# Patient Record
Sex: FEMALE | URBAN - NONMETROPOLITAN AREA
[De-identification: names, ages, dates, MRNs, and addresses within clinical notes are randomized per-mention and may not be internally consistent; named-entity substitution may affect disease eponyms.]

---

## 2022-11-22 ENCOUNTER — APPOINTMENT (OUTPATIENT)
Dept: URBAN - NONMETROPOLITAN AREA CLINIC 47 | Age: 52
Setting detail: DERMATOLOGY
End: 2022-11-28

## 2022-11-22 DIAGNOSIS — L30.9 DERMATITIS, UNSPECIFIED: ICD-10-CM

## 2022-11-22 PROCEDURE — 11105 PUNCH BX SKIN EA SEP/ADDL: CPT

## 2022-11-22 PROCEDURE — OTHER PRESCRIPTION: OTHER

## 2022-11-22 PROCEDURE — OTHER BIOPSY BY PUNCH METHOD: OTHER

## 2022-11-22 PROCEDURE — OTHER MIPS QUALITY: OTHER

## 2022-11-22 PROCEDURE — OTHER COUNSELING: OTHER

## 2022-11-22 PROCEDURE — 11104 PUNCH BX SKIN SINGLE LESION: CPT

## 2022-11-22 RX ORDER — TRIAMCINOLONE ACETONIDE 1 MG/G
CREAM TOPICAL
Qty: 454 | Refills: 3 | Status: ERX | COMMUNITY
Start: 2022-11-22

## 2022-11-22 ASSESSMENT — LOCATION ZONE DERM
LOCATION ZONE: TRUNK
LOCATION ZONE: LEG
LOCATION ZONE: ARM

## 2022-11-22 ASSESSMENT — LOCATION DETAILED DESCRIPTION DERM
LOCATION DETAILED: RIGHT ANTERIOR PROXIMAL THIGH
LOCATION DETAILED: LEFT PROXIMAL DORSAL FOREARM
LOCATION DETAILED: RIGHT LATERAL ABDOMEN

## 2022-11-22 ASSESSMENT — LOCATION SIMPLE DESCRIPTION DERM
LOCATION SIMPLE: ABDOMEN
LOCATION SIMPLE: RIGHT THIGH
LOCATION SIMPLE: LEFT FOREARM

## 2022-11-22 NOTE — PROCEDURE: COUNSELING
Detail Level: Detailed
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  Advised TAC bid prn under occlusion to those areas.

## 2022-11-29 ENCOUNTER — APPOINTMENT (OUTPATIENT)
Dept: URBAN - NONMETROPOLITAN AREA CLINIC 47 | Age: 52
Setting detail: DERMATOLOGY
End: 2022-11-29

## 2022-11-29 DIAGNOSIS — M31.0 HYPERSENSITIVITY ANGIITIS: ICD-10-CM

## 2022-11-29 PROCEDURE — OTHER ORDER TESTS: OTHER

## 2022-12-01 ENCOUNTER — RX ONLY (RX ONLY)
Age: 52
End: 2022-12-01

## 2022-12-01 RX ORDER — HALOBETASOL PROPIONATE 0.5 MG/G
CREAM TOPICAL
Qty: 50 | Refills: 3 | Status: ERX | COMMUNITY
Start: 2022-12-01

## 2022-12-20 ENCOUNTER — APPOINTMENT (OUTPATIENT)
Dept: URBAN - NONMETROPOLITAN AREA CLINIC 47 | Age: 52
Setting detail: DERMATOLOGY
End: 2022-12-26

## 2022-12-20 ENCOUNTER — RX ONLY (RX ONLY)
Age: 52
End: 2022-12-20

## 2022-12-20 DIAGNOSIS — M31.0 HYPERSENSITIVITY ANGIITIS: ICD-10-CM

## 2022-12-20 PROBLEM — L30.9 DERMATITIS, UNSPECIFIED: Status: ACTIVE | Noted: 2022-12-20

## 2022-12-20 PROCEDURE — OTHER BIOPSY BY PUNCH METHOD FOR DIF: OTHER

## 2022-12-20 PROCEDURE — OTHER MIPS QUALITY: OTHER

## 2022-12-20 PROCEDURE — OTHER PRESCRIPTION MEDICATION MANAGEMENT: OTHER

## 2022-12-20 PROCEDURE — 11104 PUNCH BX SKIN SINGLE LESION: CPT

## 2022-12-20 RX ORDER — CLOBETASOL PROPIONATE 0.5 MG/G
OINTMENT TOPICAL
Qty: 60 | Refills: 4 | Status: ERX | COMMUNITY
Start: 2022-12-20

## 2022-12-20 ASSESSMENT — LOCATION DETAILED DESCRIPTION DERM: LOCATION DETAILED: LEFT PROXIMAL PRETIBIAL REGION

## 2022-12-20 ASSESSMENT — LOCATION ZONE DERM: LOCATION ZONE: LEG

## 2022-12-20 ASSESSMENT — LOCATION SIMPLE DESCRIPTION DERM: LOCATION SIMPLE: LEFT PRETIBIAL REGION

## 2022-12-20 NOTE — PROCEDURE: PRESCRIPTION MEDICATION MANAGEMENT
Render In Strict Bullet Format?: No
Initiate Treatment: Clobetasol 0.05% ointment bid for 2-3 weeks. (Already prescribed)
Detail Level: Zone

## 2023-01-10 ENCOUNTER — RX ONLY (RX ONLY)
Age: 53
End: 2023-01-10

## 2023-01-10 RX ORDER — PREDNISONE 5 MG/1
TABLET ORAL
Qty: 150 | Refills: 0 | Status: ERX | COMMUNITY
Start: 2023-01-10